# Patient Record
Sex: MALE | Race: BLACK OR AFRICAN AMERICAN | Employment: STUDENT | URBAN - NONMETROPOLITAN AREA
[De-identification: names, ages, dates, MRNs, and addresses within clinical notes are randomized per-mention and may not be internally consistent; named-entity substitution may affect disease eponyms.]

---

## 2024-04-10 ENCOUNTER — OFFICE VISIT (OUTPATIENT)
Age: 19
End: 2024-04-10

## 2024-04-10 VITALS
WEIGHT: 286 LBS | TEMPERATURE: 98.1 F | OXYGEN SATURATION: 98 % | RESPIRATION RATE: 18 BRPM | SYSTOLIC BLOOD PRESSURE: 124 MMHG | HEART RATE: 75 BPM | DIASTOLIC BLOOD PRESSURE: 71 MMHG

## 2024-04-10 DIAGNOSIS — R05.9 COUGH, UNSPECIFIED TYPE: Primary | ICD-10-CM

## 2024-04-10 ASSESSMENT — ENCOUNTER SYMPTOMS
STRIDOR: 0
SORE THROAT: 0
CHEST TIGHTNESS: 0
COUGH: 1
CONSTIPATION: 0
DIARRHEA: 0
WHEEZING: 0
NAUSEA: 0
APNEA: 0
CHOKING: 0
SHORTNESS OF BREATH: 0

## 2024-04-10 NOTE — PROGRESS NOTES
[facility] PHYSICIANS [facility]  S[place] BAR [address] [place], [place] [id number]  Dept: [phone]  Dept Fax: [phone]    Clark Camara is a 18 y.o. malewho presents to the [facility] today for c/o of Cough      HPI:     18-year-old male presents to [facility] today with complaints of 1 week cough.  He denies fever.  Denies wheezing, shortness of breath, difficulty breathing or chest pain.  States he has not taken anything for the cough.  Denies recent illness.  Denies history of cardiac abnormality, denies history of asthma.  Denies smoking or vaping use.        No past medical history on file.    No current outpatient medications on file.     No current facility-administered medications for this visit.     No Known Allergies    :     Review of Systems   Constitutional:  Negative for activity change, appetite change and fever.   HENT:  Negative for congestion, ear pain and sore throat.    Respiratory:  Positive for cough. Negative for apnea, choking, chest tightness, shortness of breath, wheezing and stridor.    Cardiovascular:  Negative for chest pain, palpitations and leg swelling.   Gastrointestinal:  Negative for constipation, diarrhea and nausea.   Genitourinary:  Negative for difficulty urinating.   Skin:  Negative for rash.   Neurological:  Negative for light-headedness.   Psychiatric/Behavioral:  Negative for agitation.        :     Physical Exam  Vitals and nursing note reviewed.   HENT:      Head: Normocephalic.      Right Ear: Tympanic membrane normal.      Left Ear: Tympanic membrane normal.      Nose: Nose normal.      Mouth/Throat:      Pharynx: Uvula midline.   Eyes:      Conjunctiva/sclera: Conjunctivae normal.   Cardiovascular:      Rate and Rhythm: Normal rate and regular rhythm.      Heart sounds: Normal heart sounds. No murmur heard.  Pulmonary:      Effort: Pulmonary effort is

## 2024-04-24 ENCOUNTER — HOSPITAL ENCOUNTER (OUTPATIENT)
Dept: OCCUPATIONAL THERAPY | Age: 19
Setting detail: THERAPIES SERIES
Discharge: HOME | End: 2024-04-24

## 2024-04-24 NOTE — PLAN OF CARE
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 Patient states that he injured his RSF approximately 3 weeks ago during foot ball practice. Patient stated that he didn't really notice until this week and presented to ortho. Patient dx with P1 RSF fx resulting in boutonniere deformity. Patient presents with 45* extension lag at PIP.      Treatment:   [x] Patient fit for OTC spring loaded extension splint; educated patient on gradual splint wear, purpose and proper use.    [] Redressed wound in nonadherent sterile fashion   [] Stockinette over []  Hand []  Wrist []  Elbow  [] Fabricated thermoplastic splint  []  Static []  Dynamic  [x] Splint held on with use of:    [x]  Velcro straps    []  Coban   [] Other:   [x] Patient’s response to treatment: []  Poor []  Fair [x]  Good    Home Exercise Program - Instructed Patient:   [] Splint precautions, skin hygiene   [] Wound care & dressing changes (Nonadherent Sterile):  []  Daily  [] Every Other Day  [] Other   [x] Splint wearing schedule: Patient to start wear time of 1-2 minutes max multiple times throughout the day and gradually increase wear times  [x] Contrast baths   [x] Joint blocks     Treatment Goals:                          [x]  Met  []  Not Met    Date: 2024   [x] Patient’s goal: improve RSF extension lag  [] Tolerate splint as directed to allow healing    [x] Patient will be independent with home program as above    [x] Patient

## 2024-09-25 ENCOUNTER — OFFICE VISIT (OUTPATIENT)
Age: 19
End: 2024-09-25

## 2024-09-25 VITALS
WEIGHT: 284.6 LBS | SYSTOLIC BLOOD PRESSURE: 130 MMHG | OXYGEN SATURATION: 98 % | DIASTOLIC BLOOD PRESSURE: 83 MMHG | HEART RATE: 82 BPM | TEMPERATURE: 98.3 F

## 2024-09-25 DIAGNOSIS — J06.9 UPPER RESPIRATORY TRACT INFECTION, UNSPECIFIED TYPE: ICD-10-CM

## 2024-09-25 DIAGNOSIS — J02.9 PHARYNGITIS, UNSPECIFIED ETIOLOGY: Primary | ICD-10-CM

## 2024-09-25 LAB
INFLUENZA A ANTIBODY: NORMAL
INFLUENZA B ANTIBODY: NEGATIVE
S PYO AG THROAT QL: NORMAL
SARS-COV-2: NORMAL

## 2024-09-25 ASSESSMENT — ENCOUNTER SYMPTOMS
SORE THROAT: 1
EYES NEGATIVE: 1
COUGH: 1
GASTROINTESTINAL NEGATIVE: 1

## 2025-02-04 ENCOUNTER — OFFICE VISIT (OUTPATIENT)
Age: 20
End: 2025-02-04

## 2025-02-04 VITALS
DIASTOLIC BLOOD PRESSURE: 90 MMHG | HEART RATE: 92 BPM | TEMPERATURE: 98.1 F | OXYGEN SATURATION: 96 % | SYSTOLIC BLOOD PRESSURE: 156 MMHG | WEIGHT: 299 LBS

## 2025-02-04 DIAGNOSIS — R03.0 ELEVATED BLOOD PRESSURE READING: ICD-10-CM

## 2025-02-04 DIAGNOSIS — R05.1 ACUTE COUGH: Primary | ICD-10-CM

## 2025-02-04 LAB
INFLUENZA A ANTIGEN, POC: NEGATIVE
INFLUENZA B ANTIGEN, POC: NEGATIVE
LOT EXPIRE DATE: 0
LOT KIT NUMBER: 0
SARS-COV-2 RNA, POC: NEGATIVE
VALID INTERNAL CONTROL: 0
VENDOR AND KIT NAME POC: NORMAL

## 2025-02-04 ASSESSMENT — ENCOUNTER SYMPTOMS
COUGH: 1
SORE THROAT: 1
GASTROINTESTINAL NEGATIVE: 1
EYES NEGATIVE: 1

## 2025-02-04 NOTE — PROGRESS NOTES
[facility] PHYSICIANS [facility]  [address]  [place] [id number]  Dept: [phone]  Dept Fax: [phone]    Clark Camara is a 19 y.o. malewho presents to the [facility] today for c/o of Cough (X1 day dry in nature. With burning in chest. Denies SOB or chest tightness. Denies fever. Denies chills or body aches, denies runny nose congestion, headaches) and Sore Throat (X1 day )      HPI:     19-year-old male presents to [facility] today with complaints of 1 day cough and sore throat. Denies SOB or chest tightness. Denies fever. Denies chills or body aches, denies runny nose congestion, headaches. Denies a history of asthma.         No past medical history on file.    No current outpatient medications on file.     No current facility-administered medications for this visit.     No Known Allergies    :     Review of Systems   Constitutional: Negative.    HENT:  Positive for sore throat.    Eyes: Negative.    Respiratory:  Positive for cough.    Cardiovascular: Negative.    Gastrointestinal: Negative.    Genitourinary: Negative.    Musculoskeletal: Negative.    Skin: Negative.    Neurological: Negative.    Psychiatric/Behavioral: Negative.         :     Physical Exam  Vitals and nursing note reviewed.   Constitutional:       General: He is not in acute distress.     Appearance: He is well-developed. He is not ill-appearing or toxic-appearing.   HENT:      Head: Normocephalic and atraumatic.      Right Ear: Tympanic membrane, ear canal and external ear normal.      Left Ear: Tympanic membrane, ear canal and external ear normal.      Nose: Nose normal. No congestion or rhinorrhea.      Mouth/Throat:      Mouth: Mucous membranes are moist.      Pharynx: Oropharynx is clear. Uvula midline. No pharyngeal swelling, oropharyngeal exudate, posterior oropharyngeal erythema, uvula swelling or